# Patient Record
Sex: MALE | URBAN - METROPOLITAN AREA
[De-identification: names, ages, dates, MRNs, and addresses within clinical notes are randomized per-mention and may not be internally consistent; named-entity substitution may affect disease eponyms.]

---

## 2023-06-09 ENCOUNTER — OFFICE VISIT (OUTPATIENT)
Dept: URBAN - METROPOLITAN AREA CLINIC 28 | Facility: CLINIC | Age: 18
End: 2023-06-09
Payer: COMMERCIAL

## 2023-06-09 DIAGNOSIS — H52.13 MYOPIA, BILATERAL: ICD-10-CM

## 2023-06-09 DIAGNOSIS — Z83.518 FAMILY HISTORY OF OTHER SPECIFIED EYE DISORDER: Primary | ICD-10-CM

## 2023-06-09 PROCEDURE — 92004 COMPRE OPH EXAM NEW PT 1/>: CPT | Performed by: OPTOMETRIST

## 2023-06-09 ASSESSMENT — INTRAOCULAR PRESSURE
OD: 16
OS: 17

## 2023-06-09 ASSESSMENT — KERATOMETRY
OD: 42.00
OS: 42.63

## 2023-06-09 NOTE — IMPRESSION/PLAN
Impression: Family history of other specified eye disorder: Z80.1. Plan: Educated on normal ocular health findings. Positive family history of RP. Monitor.
Impression: Myopia, bilateral: H52.13. Plan: Recommend returning for vision exam/refraction.
back spasms

## 2023-06-12 ENCOUNTER — OFFICE VISIT (OUTPATIENT)
Dept: URBAN - METROPOLITAN AREA CLINIC 28 | Facility: CLINIC | Age: 18
End: 2023-06-12
Payer: COMMERCIAL

## 2023-06-12 DIAGNOSIS — H52.13 MYOPIA, BILATERAL: Primary | ICD-10-CM

## 2023-06-12 PROCEDURE — 92012 INTRM OPH EXAM EST PATIENT: CPT | Performed by: OPTOMETRIST

## 2023-06-12 ASSESSMENT — INTRAOCULAR PRESSURE
OD: 17
OS: 18

## 2023-06-12 ASSESSMENT — VISUAL ACUITY
OS: 20/20
OD: 20/20

## 2025-01-02 ENCOUNTER — OFFICE VISIT (OUTPATIENT)
Dept: URBAN - METROPOLITAN AREA CLINIC 28 | Facility: CLINIC | Age: 20
End: 2025-01-02
Payer: MEDICAID

## 2025-01-02 DIAGNOSIS — S05.8X1A OTHER INJURIES OF RIGHT EYE AND ORBIT, INITIAL ENCOUNTER: Primary | ICD-10-CM

## 2025-01-02 PROCEDURE — 99213 OFFICE O/P EST LOW 20 MIN: CPT | Performed by: OPTOMETRIST

## 2025-01-02 ASSESSMENT — INTRAOCULAR PRESSURE
OS: 16
OD: 18

## 2025-03-26 ENCOUNTER — OFFICE VISIT (OUTPATIENT)
Dept: URBAN - METROPOLITAN AREA CLINIC 28 | Facility: CLINIC | Age: 20
End: 2025-03-26
Payer: COMMERCIAL

## 2025-03-26 DIAGNOSIS — H52.13 MYOPIA, BILATERAL: Primary | ICD-10-CM

## 2025-03-26 PROCEDURE — 92012 INTRM OPH EXAM EST PATIENT: CPT | Performed by: OPTOMETRIST

## 2025-03-26 ASSESSMENT — INTRAOCULAR PRESSURE
OS: 19
OD: 19

## 2025-03-26 ASSESSMENT — VISUAL ACUITY
OD: 20/20
OS: 20/20